# Patient Record
Sex: FEMALE | Race: WHITE
[De-identification: names, ages, dates, MRNs, and addresses within clinical notes are randomized per-mention and may not be internally consistent; named-entity substitution may affect disease eponyms.]

---

## 2020-10-05 ENCOUNTER — HOSPITAL ENCOUNTER (EMERGENCY)
Dept: HOSPITAL 39 - ER | Age: 30
Discharge: HOME | End: 2020-10-05
Payer: COMMERCIAL

## 2020-10-05 VITALS — TEMPERATURE: 97.9 F

## 2020-10-05 VITALS — OXYGEN SATURATION: 99 % | DIASTOLIC BLOOD PRESSURE: 85 MMHG | SYSTOLIC BLOOD PRESSURE: 133 MMHG

## 2020-10-05 DIAGNOSIS — F17.200: ICD-10-CM

## 2020-10-05 DIAGNOSIS — Z20.828: ICD-10-CM

## 2020-10-05 DIAGNOSIS — J06.9: Primary | ICD-10-CM

## 2020-10-05 DIAGNOSIS — Z88.2: ICD-10-CM

## 2020-10-05 DIAGNOSIS — Z88.1: ICD-10-CM

## 2020-10-05 DIAGNOSIS — B97.19: ICD-10-CM

## 2020-10-05 DIAGNOSIS — Z88.8: ICD-10-CM

## 2020-10-05 NOTE — ED.PDOC
History of Present Illness





- General


Chief Complaint: General


Stated Complaint: stuffy nose, cough, sore throat


Time Seen by Provider: 10/05/20 18:04


Source: patient





- History of Present Illness


Initial Comments: 





30-year-old female who presents with chief complaint of cough and upper 

respiratory symptoms.  Onset 2 days ago and gradually worsening.  Reports 

moderate severity overall.  She has a small baby at home and was concerned about

possible COVID-19 so she came into the ED for evaluation.  Reports primary 

symptoms of congestion, rhinorrhea, intermittent dry cough, bilateral ear 

fullness, mild headache, mild sore throat.  She has been taking some 

over-the-counter medicine with little relief.  Denies any fevers, chills, chest 

pain, dyspnea, abdominal pain, nausea/vomiting/diarrhea, urinary symptoms.  No 

known recent sick contacts.  She does smoke daily.





Allergies/Adverse Reactions: 


Allergies





Clindamycin Allergy (Verified 10/05/20 18:03)


   


Levofloxacin [From Levaquin] Allergy (Verified 10/05/20 18:03)


   


Quetiapine [From Seroquel] Allergy (Verified 10/05/20 18:03)


   


Sulfamethoxazole w/Trimethoprim [From Bactrim] Allergy (Verified 10/05/20 18:03)


   


Sulfate Allergy (Verified 10/05/20 18:03)


   





Home Medications: 


Ambulatory Orders





NK  10/05/20 











Review of Systems





- Review of Systems


Review of Systems: 





10/06/20 03:06


as per HPI





All other Systems: Reviewed and Negative





Past Medical History (General)





- Patient Medical History


Surgical History: tonsillectomy





- Vaccination History


Hx Influenza Vaccination: No


Hx Pneumococcal Vaccination: No





- Activities of Daily Living


Hospice Agency (if applicable):: None





Family Medical History





- Family History


  ** Mother


Family History: Unknown





Physical Exam





- Physical Exam


General Appearance: Alert, Comfortable, No apparent distress


Eye Exam: bilateral normal


Ears, Nose, Throat: hearing grossly normal, pharyngeal erythema, other - TMs 

normal


Neck: non-tender, full range of motion, supple, normal inspection


Respiratory: chest non-tender, lungs clear, normal breath sounds, no respiratory

distress, no accessory muscle use


Cardiovascular/Chest: normal peripheral pulses, regular rate, rhythm, no edema, 

no gallop, no JVD, no murmur


Peripheral Pulses: radial,right: 2+, radial,left: 2+


Gastrointestinal/Abdominal: non tender, soft


Back Exam: normal inspection, no CVA tenderness, no vertebral tenderness


Extremity: normal range of motion, non-tender, normal inspection, no pedal edema


Neurologic: CNs II-XII nml as tested, no motor/sensory deficits, alert, normal 

mood/affect, oriented x 3


Skin Exam: normal color, warm/dry





Progress





- Progress


Progress: 





10/05/20 18:25


-Suspect mild viral URI/common cold most likely.  Consider also strep 

pharyngitis, COVID-19, pneumonia, other


-Obtain respiratory panel and strep test





10/05/20 20:25


-Respiratory panel is positive for enterovirus and rhinovirus and negative for 

all others.  Strep testing is negative


-Discussed all findings with the patient.  Given reassurance discussed continued

supportive care and expectant management at home.


-Discharged home in good condition





Chucho Feng MD


Billing #[id number]











Departure





- Departure


Clinical Impression: 


 Upper respiratory infection, viral, Rhinovirus infection, Enterovirus infection





Time of Disposition: 20:26


Disposition: Discharge to Home or Self Care


Condition: Good


Departure Forms:  ED Discharge - Pt. Copy, Patient Portal Self Enrollment


Instructions:  Viral Upper Respiratory Infection, Adult (DC)


Diet: resume usual diet


Activity: increase activity as tolerated


Home Medications: 


Ambulatory Orders





NK  10/05/20 








Additional Instructions: 


Remain well-hydrated and gradually advance your diet activity level as 

tolerated.  You may continue to take over-the-counter medications as needed for 

upper respiratory infection symptoms such as Mucinex, cough medicine, Tylenol, 

ibuprofen, Sudafed, etc. follow-up with your primary care physician as scheduled

or sooner as needed.